# Patient Record
Sex: MALE | Race: ASIAN | NOT HISPANIC OR LATINO | Employment: FULL TIME | URBAN - METROPOLITAN AREA
[De-identification: names, ages, dates, MRNs, and addresses within clinical notes are randomized per-mention and may not be internally consistent; named-entity substitution may affect disease eponyms.]

---

## 2022-04-19 ENCOUNTER — OFFICE VISIT (OUTPATIENT)
Dept: URGENT CARE | Age: 37
End: 2022-04-19
Payer: COMMERCIAL

## 2022-04-19 VITALS
DIASTOLIC BLOOD PRESSURE: 88 MMHG | TEMPERATURE: 98.9 F | SYSTOLIC BLOOD PRESSURE: 122 MMHG | RESPIRATION RATE: 18 BRPM | HEART RATE: 89 BPM | OXYGEN SATURATION: 99 %

## 2022-04-19 DIAGNOSIS — Z02.4 DRIVER'S PERMIT PE (PHYSICAL EXAMINATION): Primary | ICD-10-CM

## 2022-04-19 NOTE — PROGRESS NOTES
[address]        NAME: Char Santana is a 39 y o  male  : 1985    MRN: [id number]  DATE: 2022  TIME: 12:42 PM    Assessment and Plan   's permit PE (physical examination) [Z02 4]  1  's permit PE (physical examination)           Patient Instructions       Follow up with PCP in 3-5 days  Proceed to  ER if symptoms worsen  Chief Complaint     Chief Complaint   Patient presents with    Annual Exam     drivers license physical          History of Present Illness       HPI   Hep b carrier, no other meds  DL physical    Review of Systems   Review of Systems  Per hpi     Current Medications     No current outpatient medications on file  Current Allergies     Allergies as of 2022    (No Known Allergies)            The following portions of the patient's history were reviewed and updated as appropriate: allergies, current medications, past family history, past medical history, past social history, past surgical history and problem list      Past Medical History:   Diagnosis Date    Hepatitis        History reviewed  No pertinent surgical history  No family history on file  Medications have been verified  Objective   /88   Pulse 89   Temp 98 9 °F (37 2 °C)   Resp 18   SpO2 99%   No LMP for male patient  Physical Exam     Physical Exam  Constitutional:       General: He is not in acute distress  Appearance: He is well-developed  He is not diaphoretic  HENT:      Head: Normocephalic and atraumatic  Right Ear: Tympanic membrane and external ear normal       Left Ear: Tympanic membrane and external ear normal       Mouth/Throat:      Mouth: Mucous membranes are moist       Pharynx: Oropharynx is clear  No oropharyngeal exudate  Eyes:      Extraocular Movements: Extraocular movements intact  Conjunctiva/sclera: Conjunctivae normal    Cardiovascular:      Rate and Rhythm: Normal rate and regular rhythm        Heart sounds: Normal heart sounds  Pulmonary:      Effort: Pulmonary effort is normal  No respiratory distress  Breath sounds: Normal breath sounds  No wheezing  Abdominal:      General: Bowel sounds are normal  There is no distension  Palpations: Abdomen is soft  There is no mass  Tenderness: There is no abdominal tenderness  Musculoskeletal:         General: No swelling or tenderness  Cervical back: Normal range of motion  Right lower leg: No edema  Left lower leg: No edema  Lymphadenopathy:      Cervical: No cervical adenopathy  Skin:     General: Skin is warm  Neurological:      Mental Status: He is alert and oriented to person, place, and time

## 2023-02-13 ENCOUNTER — OFFICE VISIT (OUTPATIENT)
Dept: URGENT CARE | Age: 38
End: 2023-02-13

## 2023-02-13 VITALS
BODY MASS INDEX: 21.72 KG/M2 | TEMPERATURE: 97.7 F | WEIGHT: 150 LBS | HEART RATE: 98 BPM | RESPIRATION RATE: 20 BRPM | SYSTOLIC BLOOD PRESSURE: 120 MMHG | DIASTOLIC BLOOD PRESSURE: 82 MMHG | OXYGEN SATURATION: 97 %

## 2023-02-13 DIAGNOSIS — J01.41 ACUTE RECURRENT PANSINUSITIS: Primary | ICD-10-CM

## 2023-02-13 RX ORDER — PREDNISONE 20 MG/1
20 TABLET ORAL 2 TIMES DAILY WITH MEALS
Qty: 10 TABLET | Refills: 0 | Status: SHIPPED | OUTPATIENT
Start: 2023-02-13 | End: 2023-02-18

## 2023-02-13 NOTE — PROGRESS NOTES
[address]        NAME: Whit Hess is a 40 y o  male  : 1985    MRN: [id number]  DATE: 2023  TIME: 4:50 PM    Assessment and Plan   Acute recurrent pansinusitis [J01 41]  1  Acute recurrent pansinusitis  predniSONE 20 mg tablet            Patient Instructions     Cont with zyrtec and nasal rinse  Take prednisone   F/u with ENT  Follow up with PCP in 3-5 days  Proceed to  ER if symptoms worsen  Chief Complaint     Chief Complaint   Patient presents with   • Migraine     Migraine for 2 weeks  History of Present Illness       HPI   Reports migraine x 2 weeks  Has a chronic Hx of sinusitis, from allergies from about 1 year now  Moved to the [facility] from [facility] about 8 months ago  In the past used methylprednisolone, zyrtec, nasal rinse  Has been to an ENT and was told he has a deviated septum and a nasal polyp  Surgery is on the table for options  Review of Systems   Review of Systems   Constitutional: Negative for chills and fever  HENT: Positive for congestion, postnasal drip, rhinorrhea and sinus pressure  Negative for facial swelling and sore throat  Respiratory: Positive for cough  Negative for shortness of breath and wheezing  Cardiovascular: Negative for chest pain  Gastrointestinal: Negative for nausea and vomiting  Neurological: Positive for headaches           Current Medications       Current Outpatient Medications:   •  predniSONE 20 mg tablet, Take 1 tablet (20 mg total) by mouth 2 (two) times a day with meals for 5 days, Disp: 10 tablet, Rfl: 0  •  cetirizine (ZyrTEC) 5 MG tablet, Take 5 mg by mouth daily, Disp: , Rfl:   •  fluticasone (FLONASE) 50 mcg/act nasal spray, 2 sprays into each nostril daily, Disp: , Rfl:   •  methylprednisolone (Medrol) 4 mg tablet, Take 6 tablets (24 mg total) by mouth daily with breakfast for 2 days, THEN 5 tablets (20 mg total) daily with breakfast for 2 days, THEN 4 tablets (16 mg total) daily with breakfast for 2 days, THEN 3 tablets (12 mg total) daily with breakfast for 2 days, THEN 2 tablets (8 mg total) daily with breakfast for 2 days, THEN 1 tablet (4 mg total) daily with breakfast for 2 days, THEN 1 tablet (4 mg total) every other day for 4 days  , Disp: 44 tablet, Rfl: 0  •  Mometasone Furoate POWD, Compound Mometasone 1 mg capsule to be added to sinus rinse twice daily, Disp: 180 g, Rfl: 3  •  Triamcinolone Acetonide (NASACORT AQ NA), into each nostril, Disp: , Rfl:     Current Allergies     Allergies as of 02/13/2023 - Reviewed 02/13/2023   Allergen Reaction Noted   • Shellfish-derived products - food allergy Rash 01/31/2023            The following portions of the patient's history were reviewed and updated as appropriate: allergies, current medications, past family history, past medical history, past social history, past surgical history and problem list      Past Medical History:   Diagnosis Date   • Allergic rhinitis    • Hepatitis    • Nasal congestion        History reviewed  No pertinent surgical history  History reviewed  No pertinent family history  Medications have been verified  Objective   /82 Comment: manual  Pulse 98   Temp 97 7 °F (36 5 °C) (Temporal)   Resp 20   Wt 68 kg (150 lb)   SpO2 97%   BMI 21 72 kg/m²   No LMP for male patient  Physical Exam     Physical Exam  Constitutional:       Appearance: He is not ill-appearing or diaphoretic  HENT:      Head:      Comments: Some discomfort with palpation of the R maxillary sinus     Right Ear: Tympanic membrane normal       Left Ear: Tympanic membrane normal       Nose: Congestion and rhinorrhea present  Mouth/Throat:      Mouth: Mucous membranes are moist       Pharynx: No posterior oropharyngeal erythema  Comments: Post nasal drip  Cardiovascular:      Rate and Rhythm: Regular rhythm  Heart sounds: Normal heart sounds     Pulmonary:      Effort: Pulmonary effort is normal       Breath sounds: Normal breath sounds  Lymphadenopathy:      Cervical: No cervical adenopathy

## 2023-02-13 NOTE — LETTER
February 13, 2023     Patient: Spencer Gee   YOB: 1985   Date of Visit: 2/13/2023       To Whom it May Concern:    Spencer Gee was seen in my clinic on 2/13/2023  He may return to work/school on 02/14/2023  If you have any questions or concerns, please don't hesitate to call           Sincerely,          [facility]        CC: No Recipients

## 2023-02-27 ENCOUNTER — HOSPITAL ENCOUNTER (OUTPATIENT)
Dept: RADIOLOGY | Age: 38
Discharge: HOME | End: 2023-02-27

## 2023-02-27 DIAGNOSIS — R43.8 DECREASED SENSE OF SMELL: ICD-10-CM

## 2023-02-27 DIAGNOSIS — J32.0 CHRONIC MAXILLARY SINUSITIS: ICD-10-CM

## 2023-02-27 DIAGNOSIS — J32.2 CHRONIC ETHMOIDAL SINUSITIS: ICD-10-CM

## 2023-02-27 DIAGNOSIS — R09.81 NASAL CONGESTION: ICD-10-CM

## 2023-02-27 DIAGNOSIS — J30.89 NON-SEASONAL ALLERGIC RHINITIS DUE TO OTHER ALLERGIC TRIGGER: ICD-10-CM

## 2023-02-27 DIAGNOSIS — J32.1 CHRONIC FRONTAL SINUSITIS: ICD-10-CM

## 2023-02-27 DIAGNOSIS — J33.9 NASAL POLYPOSIS: ICD-10-CM

## 2023-02-27 DIAGNOSIS — J32.3 CHRONIC SPHENOIDAL SINUSITIS: ICD-10-CM

## 2025-06-19 ENCOUNTER — OFFICE VISIT (OUTPATIENT)
Dept: INTERNAL MEDICINE CLINIC | Age: 40
End: 2025-06-19
Payer: COMMERCIAL

## 2025-06-19 VITALS
HEIGHT: 68 IN | BODY MASS INDEX: 24.1 KG/M2 | DIASTOLIC BLOOD PRESSURE: 58 MMHG | HEART RATE: 75 BPM | TEMPERATURE: 97.6 F | SYSTOLIC BLOOD PRESSURE: 104 MMHG | WEIGHT: 159 LBS | OXYGEN SATURATION: 96 %

## 2025-06-19 DIAGNOSIS — Z00.00 ANNUAL PHYSICAL EXAM: ICD-10-CM

## 2025-06-19 DIAGNOSIS — J06.9 URTI (ACUTE UPPER RESPIRATORY INFECTION): ICD-10-CM

## 2025-06-19 DIAGNOSIS — H65.191 OTHER NON-RECURRENT ACUTE NONSUPPURATIVE OTITIS MEDIA OF RIGHT EAR: ICD-10-CM

## 2025-06-19 DIAGNOSIS — L30.9 DERMATITIS: ICD-10-CM

## 2025-06-19 DIAGNOSIS — Z76.89 ENCOUNTER TO ESTABLISH CARE: Primary | ICD-10-CM

## 2025-06-19 PROCEDURE — 99203 OFFICE O/P NEW LOW 30 MIN: CPT | Performed by: INTERNAL MEDICINE

## 2025-06-19 RX ORDER — CLOTRIMAZOLE AND BETAMETHASONE DIPROPIONATE 10; .64 MG/G; MG/G
CREAM TOPICAL 2 TIMES DAILY
Qty: 45 G | Refills: 1 | Status: SHIPPED | OUTPATIENT
Start: 2025-06-19

## 2025-06-19 RX ORDER — LATANOPROSTENE BUNOD 0.24 MG/ML
1 SOLUTION/ DROPS OPHTHALMIC
COMMUNITY
Start: 2025-05-27

## 2025-06-19 RX ORDER — TENOFOVIR DISOPROXIL FUMARATE 300 MG/1
300 TABLET, FILM COATED ORAL EVERY OTHER DAY
COMMUNITY

## 2025-06-19 NOTE — ASSESSMENT & PLAN NOTE
-Will start patient on clotrimazole-betamethasone cream for 2 weeks  - Follow-up if symptoms do not resolve  Orders:    clotrimazole-betamethasone (LOTRISONE) 1-0.05 % cream; Apply topically 2 (two) times a day

## 2025-06-19 NOTE — PROGRESS NOTES
Name: Flaco Bridges      : 1985      MRN: [id number]  Encounter Provider: Sabiha Yousif DO  Encounter Date: 2025   Encounter department: [facility]  :  Assessment & Plan  Encounter to establish care  - Patient has established care with our practice and will sign for release of his medical records from his previous PCP.  -follow-up in 1 month for an annual physical exam         URTI (acute upper respiratory infection)  - likely viral with bacterial superinfection vs bacterial  - we will start pt on Augmentin 875-125 mg twice daily for 7 days, Flonase nasal spray for rhinitis and Mucinex for productive cough  - Pt was counseled to use OTC tylenol or ibuprofen with meals for aches and pains, warm salt water gargles for sore throat and was encouraged to drink lots of fluids, get plenty of rest and wash his/her hands liberally.  - pt was also counseled to take antibiotics with food and also to use a probiotic like yogurt daily as long as he/she is taking antibiotics  - He/She should return to the clinic if his/her symptoms worsen or do not improve.    Orders:    amoxicillin-clavulanate (AUGMENTIN) 875-125 mg per tablet; Take 1 tablet by mouth every 12 (twelve) hours for 7 days    Other non-recurrent acute nonsuppurative otitis media of right ear  -We will treat with Augmentin as above  Orders:    amoxicillin-clavulanate (AUGMENTIN) 875-125 mg per tablet; Take 1 tablet by mouth every 12 (twelve) hours for 7 days    Dermatitis  -Will start patient on clotrimazole-betamethasone cream for 2 weeks  - Follow-up if symptoms do not resolve  Orders:    clotrimazole-betamethasone (LOTRISONE) 1-0.05 % cream; Apply topically 2 (two) times a day    Annual physical exam  -Will order a CBC, CMP, TSH, lipid panel  - Follow-up in 1 month for an annual physical exam  Orders:    CBC and differential; Future    TSH, 3rd generation with Free T4 reflex; Future    Comprehensive metabolic  panel; Future    Lipid panel; Future          Depression Screening and Follow-up Plan: Patient was screened for depression during today's encounter. They screened negative with a PHQ-2 score of 0.        History of Present Illness   HPI    Patient presents to establish care.    Last primary care physician-has not had one for a while since he moved here from [facility] [place] about 3 years ago    Past medical history-hepatitis B (and sees a hepatologist), glaucoma, seasonal allergies, eczema casandra in the summer     Past surgical history- tonsillectomy, surgery for drainage of  R kidney cyst    Medications-see list    Allergies -NKDA    Alcohol use -none    Nicotine use -quit smoking about 15 years ago and smoked for about 5 years at a max of half a pack of cig daily    Recreational drug use -none    Work- at food industry    Health maintenance-early this year - 2025    Immunization- up to date with 3 covid shots    Family history-  Low blood pressure -mom   Dm - dad   Thryoid ds - dad     Today, he complains of sore throat for 3 days, with associated cough productive of green mucus, itchy ears.  He also admits to postnasal drip but denies any fever, chills, night sweats, headache, dizziness,  , runny nose,  ear ache, sinus pain or pressure, wheezing,  chest pain, sob, palpitations, nausea, vomiting, diarrhea, constipation, hematochezia, hematuria, melena stools, arthralgias, myalgias, feelings of anxiety, depression or insomnia.    Has a hx of eczema and has had it for years - usually acts up in the summer   + hx of contact - 4 year old and goes to school and has been sick   Has been taking mucinex     Review of Systems   Constitutional:  Negative for activity change, chills, fatigue, fever and unexpected weight change.   HENT:  Positive for congestion, postnasal drip and sore throat. Negative for ear pain, rhinorrhea and sinus pressure.    Eyes:  Negative for pain.   Respiratory:  Positive for cough (+ green  phlegm). Negative for choking, chest tightness, shortness of breath and wheezing.    Cardiovascular:  Negative for chest pain, palpitations and leg swelling.   Gastrointestinal:  Negative for abdominal pain, constipation, diarrhea, nausea and vomiting.   Genitourinary:  Negative for dysuria and hematuria.   Musculoskeletal:  Negative for arthralgias, back pain, gait problem, joint swelling, myalgias and neck stiffness.   Skin:  Positive for rash (eczema on left ankle). Negative for pallor.   Neurological:  Negative for dizziness, tremors, seizures, syncope, light-headedness and headaches.   Hematological:  Negative for adenopathy.   Psychiatric/Behavioral:  Negative for behavioral problems.        Objective   There were no vitals taken for this visit.     Physical Exam  Constitutional:       General: He is not in acute distress.     Appearance: He is well-developed. He is not diaphoretic.   HENT:      Head: Normocephalic and atraumatic.      Right Ear: External ear normal. Tympanic membrane is erythematous.      Left Ear: External ear normal.      Nose: Nose normal.      Mouth/Throat:      Mouth: Mucous membranes are dry.      Pharynx: Posterior oropharyngeal erythema present. No oropharyngeal exudate.     Eyes:      General: No scleral icterus.        Right eye: No discharge.         Left eye: No discharge.      Conjunctiva/sclera: Conjunctivae normal.      Pupils: Pupils are equal, round, and reactive to light.     Neck:      Thyroid: No thyromegaly.      Vascular: No JVD.      Trachea: No tracheal deviation.     Cardiovascular:      Rate and Rhythm: Normal rate and regular rhythm.      Heart sounds: Normal heart sounds. No murmur heard.     No friction rub. No gallop.   Pulmonary:      Effort: Pulmonary effort is normal. No respiratory distress.      Breath sounds: Normal breath sounds. No wheezing or rales.   Chest:      Chest wall: No tenderness.   Abdominal:      General: Bowel sounds are normal. There is no  distension.      Palpations: Abdomen is soft. There is no mass.      Tenderness: There is no abdominal tenderness. There is no guarding or rebound.     Musculoskeletal:         General: No tenderness or deformity. Normal range of motion.      Cervical back: Normal range of motion and neck supple.   Lymphadenopathy:      Head:      Right side of head: Submandibular adenopathy present.      Left side of head: Submandibular adenopathy present.      Cervical: No cervical adenopathy.     Skin:     General: Skin is warm and dry.      Coloration: Skin is not pale.      Findings: Rash (scaly hyperpigmented rash on the medial aspect of the left ankle - concerning for healing eczematous rash) present. No erythema.     Neurological:      Mental Status: He is alert and oriented to person, place, and time.      Cranial Nerves: No cranial nerve deficit.      Motor: No abnormal muscle tone.      Coordination: Coordination normal.      Deep Tendon Reflexes: Reflexes are normal and symmetric.     Psychiatric:         Behavior: Behavior normal.

## 2025-06-19 NOTE — ASSESSMENT & PLAN NOTE
-Will order a CBC, CMP, TSH, lipid panel  - Follow-up in 1 month for an annual physical exam  Orders:    CBC and differential; Future    TSH, 3rd generation with Free T4 reflex; Future    Comprehensive metabolic panel; Future    Lipid panel; Future

## 2025-07-16 ENCOUNTER — APPOINTMENT (OUTPATIENT)
Dept: LAB | Age: 40
End: 2025-07-16
Attending: INTERNAL MEDICINE
Payer: COMMERCIAL

## 2025-07-16 DIAGNOSIS — Z00.00 ANNUAL PHYSICAL EXAM: ICD-10-CM

## 2025-07-16 LAB
ALBUMIN SERPL BCG-MCNC: 4.3 G/DL (ref 3.5–5)
ALP SERPL-CCNC: 43 U/L (ref 34–104)
ALT SERPL W P-5'-P-CCNC: 24 U/L (ref 7–52)
ANION GAP SERPL CALCULATED.3IONS-SCNC: 9 MMOL/L (ref 4–13)
AST SERPL W P-5'-P-CCNC: 22 U/L (ref 13–39)
BASOPHILS # BLD AUTO: 0.05 THOUSANDS/ÂΜL (ref 0–0.1)
BASOPHILS NFR BLD AUTO: 1 % (ref 0–1)
BILIRUB SERPL-MCNC: 0.61 MG/DL (ref 0.2–1)
BUN SERPL-MCNC: 15 MG/DL (ref 5–25)
CALCIUM SERPL-MCNC: 9 MG/DL (ref 8.4–10.2)
CHLORIDE SERPL-SCNC: 106 MMOL/L (ref 96–108)
CHOLEST SERPL-MCNC: 194 MG/DL (ref ?–200)
CO2 SERPL-SCNC: 27 MMOL/L (ref 21–32)
CREAT SERPL-MCNC: 1.18 MG/DL (ref 0.6–1.3)
EOSINOPHIL # BLD AUTO: 0.45 THOUSAND/ÂΜL (ref 0–0.61)
EOSINOPHIL NFR BLD AUTO: 7 % (ref 0–6)
ERYTHROCYTE [DISTWIDTH] IN BLOOD BY AUTOMATED COUNT: 12.8 % (ref 11.6–15.1)
GFR SERPL CREATININE-BSD FRML MDRD: 76 ML/MIN/1.73SQ M
GLUCOSE P FAST SERPL-MCNC: 89 MG/DL (ref 65–99)
HCT VFR BLD AUTO: 48.4 % (ref 36.5–49.3)
HDLC SERPL-MCNC: 40 MG/DL
HGB BLD-MCNC: 16 G/DL (ref 12–17)
IMM GRANULOCYTES # BLD AUTO: 0.01 THOUSAND/UL (ref 0–0.2)
IMM GRANULOCYTES NFR BLD AUTO: 0 % (ref 0–2)
LDLC SERPL CALC-MCNC: 128 MG/DL (ref 0–100)
LYMPHOCYTES # BLD AUTO: 2.67 THOUSANDS/ÂΜL (ref 0.6–4.47)
LYMPHOCYTES NFR BLD AUTO: 44 % (ref 14–44)
MCH RBC QN AUTO: 30 PG (ref 26.8–34.3)
MCHC RBC AUTO-ENTMCNC: 33.1 G/DL (ref 31.4–37.4)
MCV RBC AUTO: 91 FL (ref 82–98)
MONOCYTES # BLD AUTO: 0.4 THOUSAND/ÂΜL (ref 0.17–1.22)
MONOCYTES NFR BLD AUTO: 7 % (ref 4–12)
NEUTROPHILS # BLD AUTO: 2.5 THOUSANDS/ÂΜL (ref 1.85–7.62)
NEUTS SEG NFR BLD AUTO: 41 % (ref 43–75)
NONHDLC SERPL-MCNC: 154 MG/DL
NRBC BLD AUTO-RTO: 0 /100 WBCS
PLATELET # BLD AUTO: 339 THOUSANDS/UL (ref 149–390)
PMV BLD AUTO: 9.6 FL (ref 8.9–12.7)
POTASSIUM SERPL-SCNC: 4.1 MMOL/L (ref 3.5–5.3)
PROT SERPL-MCNC: 7.5 G/DL (ref 6.4–8.4)
RBC # BLD AUTO: 5.34 MILLION/UL (ref 3.88–5.62)
SODIUM SERPL-SCNC: 142 MMOL/L (ref 135–147)
TRIGL SERPL-MCNC: 132 MG/DL (ref ?–150)
TSH SERPL DL<=0.05 MIU/L-ACNC: 1.87 UIU/ML (ref 0.45–4.5)
WBC # BLD AUTO: 6.08 THOUSAND/UL (ref 4.31–10.16)

## 2025-07-16 PROCEDURE — 80061 LIPID PANEL: CPT

## 2025-07-16 PROCEDURE — 36415 COLL VENOUS BLD VENIPUNCTURE: CPT

## 2025-07-16 PROCEDURE — 85025 COMPLETE CBC W/AUTO DIFF WBC: CPT

## 2025-07-16 PROCEDURE — 84443 ASSAY THYROID STIM HORMONE: CPT

## 2025-07-16 PROCEDURE — 80053 COMPREHEN METABOLIC PANEL: CPT
